# Patient Record
Sex: FEMALE | Race: WHITE | NOT HISPANIC OR LATINO | URBAN - NONMETROPOLITAN AREA
[De-identification: names, ages, dates, MRNs, and addresses within clinical notes are randomized per-mention and may not be internally consistent; named-entity substitution may affect disease eponyms.]

---

## 2021-08-31 ENCOUNTER — TELEPHONE ENCOUNTER (OUTPATIENT)
Dept: URBAN - NONMETROPOLITAN AREA CLINIC 2 | Facility: CLINIC | Age: 76
End: 2021-08-31

## 2021-08-31 ENCOUNTER — WEB ENCOUNTER (OUTPATIENT)
Dept: URBAN - NONMETROPOLITAN AREA CLINIC 2 | Facility: CLINIC | Age: 76
End: 2021-08-31

## 2021-08-31 ENCOUNTER — LAB OUTSIDE AN ENCOUNTER (OUTPATIENT)
Dept: URBAN - NONMETROPOLITAN AREA CLINIC 2 | Facility: CLINIC | Age: 76
End: 2021-08-31

## 2021-08-31 ENCOUNTER — OFFICE VISIT (OUTPATIENT)
Dept: URBAN - NONMETROPOLITAN AREA CLINIC 2 | Facility: CLINIC | Age: 76
End: 2021-08-31
Payer: MEDICARE

## 2021-08-31 VITALS
SYSTOLIC BLOOD PRESSURE: 123 MMHG | TEMPERATURE: 96.4 F | WEIGHT: 198 LBS | HEART RATE: 81 BPM | DIASTOLIC BLOOD PRESSURE: 70 MMHG | HEIGHT: 63 IN | BODY MASS INDEX: 35.08 KG/M2

## 2021-08-31 DIAGNOSIS — R94.5 ABNORMAL LFTS: ICD-10-CM

## 2021-08-31 DIAGNOSIS — Z12.11 COLON CANCER SCREENING: ICD-10-CM

## 2021-08-31 DIAGNOSIS — K59.00 CONSTIPATION, UNSPECIFIED CONSTIPATION TYPE: ICD-10-CM

## 2021-08-31 PROCEDURE — 99204 OFFICE O/P NEW MOD 45 MIN: CPT | Performed by: INTERNAL MEDICINE

## 2021-08-31 NOTE — HPI-TODAY'S VISIT:
8/31/2021: Initial Gastroenterology Clinic Visit Ms. Mercer is a 76 year old female with past medical history of hypertension, hyperlipidemia, CAD s/p cardiac catheterization with cardiac stent placement, who presents for evaluation of elevated aminotransferases.  Ms. Mercer notes that over the past month, she has experienced increased fatigue. She also experienced an episode of atypical chest pain and presented to [facility] on August 16, 2021. At the time troponins ewere negative. Laboratory evaluation showed a WBC 7.30, hemoglobin 14.9, hematocrit 45.3, platelets 274. Sodium 136, potassium 3.8, chloride 95, BUN 11, creatinine 0.56. , , alkaline phosphatase 264, total bilirubin 0.7.   At [facility], recommendation was to undergo imaging studies with abdominal ultrasound to evaluate the elevated aminotransferases, but Ms. Mercer respectfully declined.   Ms. Mercer denies family history of liver disease. Denies alcohol use. Denies recreational drug use. Denies changes in medication. Denies any recent illness. She denies abdominal pain.   Ms. Mercer underwent two colonoscopies throughout her life. Her last colonoscopy was in 2016 without polyps. She denies family history of colon cancer.  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

## 2021-09-01 LAB
A/G RATIO: 1.5
ALBUMIN: 4
ALKALINE PHOSPHATASE: 151
ALT (SGPT): 11
AST (SGOT): 15
BASO (ABSOLUTE): 0.1
BASOS: 1
BILIRUBIN, TOTAL: 0.2
BUN/CREATININE RATIO: 43
BUN: 21
CALCIUM: 9.6
CARBON DIOXIDE, TOTAL: 25
CHLORIDE: 101
CREATININE: 0.49
EGFR IF AFRICN AM: 108
EGFR IF NONAFRICN AM: 94
EOS (ABSOLUTE): 0.1
EOS: 2
GLOBULIN, TOTAL: 2.6
GLUCOSE: 93
HEMATOCRIT: 45.6
HEMATOLOGY COMMENTS:: (no result)
HEMOGLOBIN: 14.8
IMMATURE CELLS: (no result)
IMMATURE GRANS (ABS): 0
IMMATURE GRANULOCYTES: 0
LYMPHS (ABSOLUTE): 2.6
LYMPHS: 28
MCH: 31.2
MCHC: 32.5
MCV: 96
MONOCYTES(ABSOLUTE): 0.6
MONOCYTES: 6
NEUTROPHILS (ABSOLUTE): 5.8
NEUTROPHILS: 63
NRBC: (no result)
PLATELETS: 452
POTASSIUM: 4.6
PROTEIN, TOTAL: 6.6
RBC: 4.75
RDW: 13.5
SODIUM: 140
WBC: 9.3

## 2021-09-29 ENCOUNTER — TELEPHONE ENCOUNTER (OUTPATIENT)
Dept: URBAN - NONMETROPOLITAN AREA CLINIC 2 | Facility: CLINIC | Age: 76
End: 2021-09-29

## 2021-09-29 ENCOUNTER — LAB OUTSIDE AN ENCOUNTER (OUTPATIENT)
Dept: URBAN - NONMETROPOLITAN AREA CLINIC 2 | Facility: CLINIC | Age: 76
End: 2021-09-29

## 2021-09-29 ENCOUNTER — OFFICE VISIT (OUTPATIENT)
Dept: URBAN - NONMETROPOLITAN AREA CLINIC 13 | Facility: CLINIC | Age: 76
End: 2021-09-29

## 2021-10-15 ENCOUNTER — TELEPHONE ENCOUNTER (OUTPATIENT)
Dept: URBAN - NONMETROPOLITAN AREA CLINIC 2 | Facility: CLINIC | Age: 76
End: 2021-10-15

## 2021-10-15 ENCOUNTER — OFFICE VISIT (OUTPATIENT)
Dept: URBAN - NONMETROPOLITAN AREA CLINIC 2 | Facility: CLINIC | Age: 76
End: 2021-10-15
Payer: MEDICARE

## 2021-10-15 VITALS
SYSTOLIC BLOOD PRESSURE: 156 MMHG | TEMPERATURE: 97 F | WEIGHT: 198 LBS | BODY MASS INDEX: 35.08 KG/M2 | DIASTOLIC BLOOD PRESSURE: 76 MMHG | HEIGHT: 63 IN | HEART RATE: 61 BPM

## 2021-10-15 DIAGNOSIS — Z12.11 COLON CANCER SCREENING: ICD-10-CM

## 2021-10-15 DIAGNOSIS — K59.00 CONSTIPATION, UNSPECIFIED CONSTIPATION TYPE: ICD-10-CM

## 2021-10-15 DIAGNOSIS — R94.5 ABNORMAL LFTS: ICD-10-CM

## 2021-10-15 DIAGNOSIS — R93.5 ABNORMAL COMPUTED TOMOGRAPHY OF ABDOMEN AND PELVIS: ICD-10-CM

## 2021-10-15 PROCEDURE — 99214 OFFICE O/P EST MOD 30 MIN: CPT | Performed by: INTERNAL MEDICINE

## 2021-10-15 NOTE — HPI-TODAY'S VISIT:
8/31/2021: Initial Gastroenterology Clinic Visit   Ms. Mercer is a 76 year old female with past medical history of hypertension, hyperlipidemia, CAD s/p cardiac catheterization with cardiac stent placement, who presents for evaluation of elevated aminotransferases.  Ms. Mercer notes that over the past month, she has experienced increased fatigue. She also experienced an episode of atypical chest pain and presented to [facility] on August 16, 2021. At the time troponins were negative. Laboratory evaluation showed a WBC 7.30, hemoglobin 14.9, hematocrit 45.3, platelets 274. Sodium 136, potassium 3.8, chloride 95, BUN 11, creatinine 0.56. , , alkaline phosphatase 264, total bilirubin 0.7.   At [facility], recommendation was to undergo imaging studies with abdominal ultrasound to evaluate the elevated aminotransferases, but Ms. Mercer respectfully declined.   Ms. Mercer denies family history of liver disease. Denies alcohol use. Denies recreational drug use. Denies changes in medication. Denies any recent illness. She denies abdominal pain.   Ms. Mercer underwent two colonoscopies throughout her life. Her last colonoscopy was in 2016 without polyps. She denies family history of colon cancer.  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 8/31/2021: WBC 9.3, hemoglobin 14.8, hematocrit 45.6, platelets 452. Chemistry panel normal. AST normal at 15, ALT normal at 11, alkaline phosphatase elevated at 151, total bilirubin normal.  9/13/2021: CT Abdomen and Pelvis with Contrast IMPRESSION: 1. There is no duct dilatation or liver mass. The portal vein and hepatic veins are patent. 2. Incidental focal circumferential thickening involving the proximal most jejunum. This may be nonspecific however follow-up CT enterography may be of benefit.  10/15/2021: Gastroenterology Follow-Up Visit Ms. Mercer has noted constipation throughout her life which has exacerbated recently. She requires the use of 6 ex lax in order to facilitate a bowel mvoement. Over the past 8-10 months she has had two spells of significant constipation. She can have between 3 bowel movements per week and one bowel movement per week. She denies blood in the stool. She does strain to have a bowel movement. She does experience incontinence at times.

## 2021-10-16 ENCOUNTER — TELEPHONE ENCOUNTER (OUTPATIENT)
Dept: URBAN - NONMETROPOLITAN AREA CLINIC 2 | Facility: CLINIC | Age: 76
End: 2021-10-16

## 2021-10-16 PROBLEM — 129679001: Status: ACTIVE | Noted: 2021-10-16

## 2021-10-16 LAB
A/G RATIO: 1.8
ALBUMIN: 4.1
ALKALINE PHOSPHATASE: 114
ALT (SGPT): 14
AST (SGOT): 16
BASO (ABSOLUTE): 0.1
BASOS: 1
BILIRUBIN, TOTAL: 0.4
BUN/CREATININE RATIO: 38
BUN: 27
CALCIUM: 9.4
CARBON DIOXIDE, TOTAL: 24
CHLORIDE: 105
CREATININE: 0.72
EGFR IF AFRICN AM: 93
EGFR IF NONAFRICN AM: 80
EOS (ABSOLUTE): 0.2
EOS: 2
GLOBULIN, TOTAL: 2.3
GLUCOSE: 78
HEMATOCRIT: 47.3
HEMATOLOGY COMMENTS:: (no result)
HEMOGLOBIN: 14.7
IMMATURE CELLS: (no result)
IMMATURE GRANS (ABS): 0
IMMATURE GRANULOCYTES: 0
LYMPHS (ABSOLUTE): 2.2
LYMPHS: 26
MCH: 29.5
MCHC: 31.1
MCV: 95
MONOCYTES(ABSOLUTE): 0.6
MONOCYTES: 6
NEUTROPHILS (ABSOLUTE): 5.6
NEUTROPHILS: 65
NRBC: (no result)
PLATELETS: 258
POTASSIUM: 4.7
PROTEIN, TOTAL: 6.4
RBC: 4.99
RDW: 13.3
SODIUM: 141
TSH: 0.59
WBC: 8.7

## 2021-12-10 ENCOUNTER — LAB OUTSIDE AN ENCOUNTER (OUTPATIENT)
Dept: URBAN - NONMETROPOLITAN AREA CLINIC 2 | Facility: CLINIC | Age: 76
End: 2021-12-10

## 2021-12-10 ENCOUNTER — OFFICE VISIT (OUTPATIENT)
Dept: URBAN - NONMETROPOLITAN AREA CLINIC 2 | Facility: CLINIC | Age: 76
End: 2021-12-10
Payer: MEDICARE

## 2021-12-10 ENCOUNTER — WEB ENCOUNTER (OUTPATIENT)
Dept: URBAN - NONMETROPOLITAN AREA CLINIC 2 | Facility: CLINIC | Age: 76
End: 2021-12-10

## 2021-12-10 VITALS
TEMPERATURE: 97 F | WEIGHT: 204 LBS | HEART RATE: 64 BPM | BODY MASS INDEX: 36.14 KG/M2 | DIASTOLIC BLOOD PRESSURE: 69 MMHG | SYSTOLIC BLOOD PRESSURE: 151 MMHG | HEIGHT: 63 IN

## 2021-12-10 DIAGNOSIS — Z12.11 COLON CANCER SCREENING: ICD-10-CM

## 2021-12-10 DIAGNOSIS — K59.00 CONSTIPATION, UNSPECIFIED CONSTIPATION TYPE: ICD-10-CM

## 2021-12-10 DIAGNOSIS — R93.5 ABNORMAL COMPUTED TOMOGRAPHY OF ABDOMEN AND PELVIS: ICD-10-CM

## 2021-12-10 DIAGNOSIS — R94.5 ABNORMAL LFTS: ICD-10-CM

## 2021-12-10 PROCEDURE — 99214 OFFICE O/P EST MOD 30 MIN: CPT | Performed by: INTERNAL MEDICINE

## 2021-12-10 NOTE — HPI-TODAY'S VISIT:
8/31/2021: Initial Gastroenterology Clinic Visit     Ms. Mercer is a 76 year old female with past medical history of hypertension, hyperlipidemia, CAD s/p cardiac catheterization with cardiac stent placement, who presents for evaluation of elevated aminotransferases.  Ms. Mercer notes that over the past month, she has experienced increased fatigue. She also experienced an episode of atypical chest pain and presented to [facility] on August 16, 2021. At the time troponins were negative. Laboratory evaluation showed a WBC 7.30, hemoglobin 14.9, hematocrit 45.3, platelets 274. Sodium 136, potassium 3.8, chloride 95, BUN 11, creatinine 0.56. , , alkaline phosphatase 264, total bilirubin 0.7.   At [facility], recommendation was to undergo imaging studies with abdominal ultrasound to evaluate the elevated aminotransferases, but Ms. Mercer respectfully declined.   Ms. Mercer denies family history of liver disease. Denies alcohol use. Denies recreational drug use. Denies changes in medication. Denies any recent illness. She denies abdominal pain.   Ms. Mercer underwent two colonoscopies throughout her life. Her last colonoscopy was in 2016 without polyps. She denies family history of colon cancer.  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 8/31/2021: WBC 9.3, hemoglobin 14.8, hematocrit 45.6, platelets 452. Chemistry panel normal. AST normal at 15, ALT normal at 11, alkaline phosphatase elevated at 151, total bilirubin normal.  9/13/2021: CT Abdomen and Pelvis with Contrast IMPRESSION: 1. There is no duct dilatation or liver mass. The portal vein and hepatic veins are patent. 2. Incidental focal circumferential thickening involving the proximal most jejunum. This may be nonspecific however follow-up CT enterography may be of benefit.  10/15/2021: Gastroenterology Follow-Up Visit Ms. Mercer has noted constipation throughout her life which has exacerbated recently. She requires the use of 6 ex lax in order to facilitate a bowel mvoement. Over the past 8-10 months she has had two spells of significant constipation. She can have between 3 bowel movements per week and one bowel movement per week. She denies blood in the stool. She does strain to have a bowel movement. She does experience incontinence at times.  10/15/2021: CBC with differential normal, chemistry panel normal, LFTs normal. TSH normal. 11/24/2021: CT-Enterography IMPRESSION: 1. Small bowel wall thickening for which the study was performed is visualized only on a transient basis and is felt to be related to physiology rather than a true underlying mass.  12/10/2021: Gastroenterology Follow-Up Visit Ms. Mercer did not have adequate repsonse with bowel movements to linaclotide 145 mcg PO daily. She did adjust her diet and has used 8 cashews + apple which improved her bowel movements. She is interested in pursuing anorectal manometry. She denies abdominal pain.

## 2021-12-27 ENCOUNTER — OFFICE VISIT (OUTPATIENT)
Dept: URBAN - METROPOLITAN AREA MEDICAL CENTER 28 | Facility: MEDICAL CENTER | Age: 76
End: 2021-12-27
Payer: MEDICARE

## 2021-12-27 DIAGNOSIS — K59.09 CHANGE IN BOWEL MOVEMENTS INTERMITTENT CONSTIPATION. URGENCY IN THE MORNING.: ICD-10-CM

## 2021-12-27 PROCEDURE — 91120 RECTAL SENSATION TEST: CPT | Performed by: INTERNAL MEDICINE

## 2021-12-27 PROCEDURE — 91122 ANAL PRESSURE RECORD: CPT | Performed by: INTERNAL MEDICINE

## 2022-03-07 ENCOUNTER — OFFICE VISIT (OUTPATIENT)
Dept: URBAN - NONMETROPOLITAN AREA CLINIC 2 | Facility: CLINIC | Age: 77
End: 2022-03-07
Payer: MEDICARE

## 2022-03-07 VITALS
SYSTOLIC BLOOD PRESSURE: 152 MMHG | HEART RATE: 62 BPM | BODY MASS INDEX: 36.68 KG/M2 | HEIGHT: 63 IN | WEIGHT: 207 LBS | DIASTOLIC BLOOD PRESSURE: 82 MMHG

## 2022-03-07 DIAGNOSIS — K59.00 CONSTIPATION, UNSPECIFIED CONSTIPATION TYPE: ICD-10-CM

## 2022-03-07 DIAGNOSIS — Z12.11 COLON CANCER SCREENING: ICD-10-CM

## 2022-03-07 DIAGNOSIS — R94.5 ABNORMAL LFTS: ICD-10-CM

## 2022-03-07 DIAGNOSIS — R93.5 ABNORMAL COMPUTED TOMOGRAPHY OF ABDOMEN AND PELVIS: ICD-10-CM

## 2022-03-07 PROCEDURE — 99214 OFFICE O/P EST MOD 30 MIN: CPT | Performed by: INTERNAL MEDICINE

## 2022-03-07 RX ORDER — METHOCARBAMOL 500 MG/1
TABLET ORAL
Qty: 60 | Status: ACTIVE | COMMUNITY

## 2022-03-07 RX ORDER — COLCHICINE 0.6 MG/1
TABLET ORAL
Qty: 30 | Status: ACTIVE | COMMUNITY

## 2022-03-07 RX ORDER — GABAPENTIN 600 MG/1
TABLET ORAL
Qty: 90 | Refills: 0 | Status: ACTIVE | COMMUNITY

## 2022-03-07 RX ORDER — ALPRAZOLAM 0.25 MG/1
TABLET ORAL
Qty: 60 | Status: ACTIVE | COMMUNITY

## 2022-03-07 NOTE — HPI-TODAY'S VISIT:
8/31/2021: Initial Gastroenterology Clinic Visit        Ms. Mercer is a 76 year old female with past medical history of hypertension, hyperlipidemia, CAD s/p cardiac catheterization with cardiac stent placement, who presents for evaluation of elevated aminotransferases.  Ms. Mercer notes that over the past month, she has experienced increased fatigue. She also experienced an episode of atypical chest pain and presented to [facility] on August 16, 2021. At the time troponins were negative. Laboratory evaluation showed a WBC 7.30, hemoglobin 14.9, hematocrit 45.3, platelets 274. Sodium 136, potassium 3.8, chloride 95, BUN 11, creatinine 0.56. , , alkaline phosphatase 264, total bilirubin 0.7.   At [facility], recommendation was to undergo imaging studies with abdominal ultrasound to evaluate the elevated aminotransferases, but Ms. Mercer respectfully declined.   Ms. Mercer denies family history of liver disease. Denies alcohol use. Denies recreational drug use. Denies changes in medication. Denies any recent illness. She denies abdominal pain.   Ms. Mercer underwent two colonoscopies throughout her life. Her last colonoscopy was in 2016 without polyps. She denies family history of colon cancer.  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 8/31/2021: WBC 9.3, hemoglobin 14.8, hematocrit 45.6, platelets 452. Chemistry panel normal. AST normal at 15, ALT normal at 11, alkaline phosphatase elevated at 151, total bilirubin normal.  9/13/2021: CT Abdomen and Pelvis with Contrast IMPRESSION: 1. There is no duct dilatation or liver mass. The portal vein and hepatic veins are patent. 2. Incidental focal circumferential thickening involving the proximal most jejunum. This may be nonspecific however follow-up CT enterography may be of benefit.  10/15/2021: Gastroenterology Follow-Up Visit Ms. Mercer has noted constipation throughout her life which has exacerbated recently. She requires the use of 6 ex lax in order to facilitate a bowel mvoement. Over the past 8-10 months she has had two spells of significant constipation. She can have between 3 bowel movements per week and one bowel movement per week. She denies blood in the stool. She does strain to have a bowel movement. She does experience incontinence at times.  10/15/2021: CBC with differential normal, chemistry panel normal, LFTs normal. TSH normal. 11/24/2021: CT-Enterography IMPRESSION: 1. Small bowel wall thickening for which the study was performed is visualized only on a transient basis and is felt to be related to physiology rather than a true underlying mass.  12/10/2021: Gastroenterology Follow-Up Visit Ms. Mercer did not have adequate repsonse with bowel movements to linaclotide 145 mcg PO daily. She did adjust her diet and has used 8 cashews + apple which improved her bowel movements. She is interested in pursuing anorectal manometry. She denies abdominal pain.  12/27/2021: Anorectal Manometry  Overall mostly normal anorectal manometry study. Pelvic floor mechanisms are mostly intact with no paradoxical pushes seen and patient was able to pass the balloon defecation trial. Does have mildly abnormal rectal balloon sensory threshold and one could consider biofeedback therapy.  3/7/2022: Gastroenterology Follow-Up Visit  Ms. Mercer stopped linaclotide 290 mcg PO daily due to incontinence. She has noted that with dietary modifications her constipation has improved. Denies abdominal pain. We did discuss referral to physical therapy for treatment of dyssynergic defecation, hwoever, Ms. Mercer has respecfully declined as she notes her constipation has improved. Denies melena, hematochezia, abdominal pain, weight loss.

## 2022-03-15 ENCOUNTER — DASHBOARD ENCOUNTERS (OUTPATIENT)
Age: 77
End: 2022-03-15

## 2022-03-15 PROBLEM — 305058001: Status: ACTIVE | Noted: 2021-08-31

## 2022-03-15 PROBLEM — 15634181000119107: Status: ACTIVE | Noted: 2021-09-29

## 2022-03-15 PROBLEM — 166603001: Status: ACTIVE | Noted: 2021-08-31

## 2022-03-15 PROBLEM — 14760008: Status: ACTIVE | Noted: 2021-08-31

## 2022-09-12 ENCOUNTER — OFFICE VISIT (OUTPATIENT)
Dept: URBAN - NONMETROPOLITAN AREA CLINIC 2 | Facility: CLINIC | Age: 77
End: 2022-09-12